# Patient Record
Sex: FEMALE | Race: WHITE
[De-identification: names, ages, dates, MRNs, and addresses within clinical notes are randomized per-mention and may not be internally consistent; named-entity substitution may affect disease eponyms.]

---

## 2018-04-03 ENCOUNTER — HOSPITAL ENCOUNTER (EMERGENCY)
Age: 43
Discharge: PSYCHIATRIC OR SUBSTANCE USE FACILITY | End: 2018-04-03
Payer: MEDICARE

## 2018-04-03 VITALS
RESPIRATION RATE: 18 BRPM | TEMPERATURE: 98 F | HEART RATE: 71 BPM | DIASTOLIC BLOOD PRESSURE: 85 MMHG | OXYGEN SATURATION: 97 % | SYSTOLIC BLOOD PRESSURE: 116 MMHG

## 2018-04-03 VITALS
OXYGEN SATURATION: 100 % | DIASTOLIC BLOOD PRESSURE: 68 MMHG | SYSTOLIC BLOOD PRESSURE: 152 MMHG | HEART RATE: 93 BPM | RESPIRATION RATE: 18 BRPM

## 2018-04-03 VITALS
SYSTOLIC BLOOD PRESSURE: 147 MMHG | TEMPERATURE: 97.2 F | RESPIRATION RATE: 18 BRPM | DIASTOLIC BLOOD PRESSURE: 91 MMHG | OXYGEN SATURATION: 100 % | HEART RATE: 75 BPM

## 2018-04-03 VITALS
HEART RATE: 72 BPM | RESPIRATION RATE: 16 BRPM | OXYGEN SATURATION: 98 % | SYSTOLIC BLOOD PRESSURE: 149 MMHG | DIASTOLIC BLOOD PRESSURE: 114 MMHG

## 2018-04-03 VITALS
OXYGEN SATURATION: 99 % | DIASTOLIC BLOOD PRESSURE: 114 MMHG | RESPIRATION RATE: 18 BRPM | HEART RATE: 80 BPM | SYSTOLIC BLOOD PRESSURE: 149 MMHG

## 2018-04-03 VITALS — BODY MASS INDEX: 27.2 KG/M2

## 2018-04-03 DIAGNOSIS — Z72.0: ICD-10-CM

## 2018-04-03 DIAGNOSIS — F31.9: Primary | ICD-10-CM

## 2018-04-03 DIAGNOSIS — F19.10: ICD-10-CM

## 2018-04-03 LAB
ALCOHOL, BLOOD (MEDICAL)-SERUM: 4 MG/DL
AMPHET UR-MCNC: POSITIVE NG/ML
ANION GAP: 6 (ref 5–15)
B-HCG SERPL QL: NEGATIVE NEGATIVE
BARBITURATE URINE VISTA: NEGATIVE
BENZODIAZEPINE URINE VISTA: NEGATIVE
BUN SERPL-MCNC: 15 MG/DL (ref 7–18)
BUN/CREAT RATIO: 19.3 RATIO (ref 10–20)
CALCIUM SERPL-MCNC: 9.3 MG/DL (ref 8.5–10.1)
CARBON DIOXIDE: 29 MMOL/L (ref 21–32)
CHLORIDE: 102 MMOL/L (ref 98–107)
COCAINE URINE VISTA: NEGATIVE
DEPRECATED RDW RBC: 40.8 FL (ref 35.1–43.9)
DIFFERENTIAL INDICATED: (no result)
DRUG CONFIRMATION TO FOLLOW?: (no result)
ECSTACY URINE VISTA: NEGATIVE
ERYTHROCYTE [DISTWIDTH] IN BLOOD: 13.2 % (ref 11.6–14.6)
EST GLOM FILT RATE - AFR AMER: 104 ML/MIN (ref 60–?)
ESTIMATED CREATININE CLEARANCE: 81.13 ML/MIN
GLUCOSE: 84 MG/DL (ref 74–106)
HCG SERPL QL: NEGATIVE NEGATIVE
HCT VFR BLD AUTO: 39.1 % (ref 37–47)
HEMOGLOBIN: 13.4 G/DL (ref 12–15)
HGB BLD-MCNC: 13.4 G/DL (ref 12–15)
IMMATURE GRANULOCYTES COUNT: 0.01 X10^3/UL (ref 0–0)
MCV RBC: 85.2 FL (ref 81–99)
MEAN CORP HGB CONC: 34.3 G/GL (ref 32–36)
MEAN PLATELET VOL.: 10.8 FL (ref 6.2–12)
METHADONE URINE VISTA: NEGATIVE
PCP UR QL: NEGATIVE
PH UR: 6 [PH]
PLATELET # BLD: 297 K/MM3 (ref 150–450)
PLATELET COUNT: 297 K/MM3 (ref 150–450)
POSITIVE COUNT: NO
POSITIVE DIFFERENTIAL: NO
POSITIVE MORPHOLOGY: NO
POTASSIUM: 3.7 MMOL/L (ref 3.5–5.1)
RBC # BLD AUTO: 4.59 M/MM3 (ref 4.2–5.4)
RBC DISTRIBUTION WIDTH CV: 13.2 % (ref 11.6–14.6)
RBC DISTRIBUTION WIDTH SD: 40.8 FL (ref 35.1–43.9)
THC URINE VISTA: POSITIVE
WBC # BLD AUTO: 10.2 K/MM3 (ref 4.4–11)
WHITE BLOOD COUNT: 10.2 K/MM3 (ref 4.4–11)

## 2018-04-03 PROCEDURE — 36415 COLL VENOUS BLD VENIPUNCTURE: CPT

## 2018-04-03 PROCEDURE — 80320 DRUG SCREEN QUANTALCOHOLS: CPT

## 2018-04-03 PROCEDURE — 96372 THER/PROPH/DIAG INJ SC/IM: CPT

## 2018-04-03 PROCEDURE — 99285 EMERGENCY DEPT VISIT HI MDM: CPT

## 2018-04-03 PROCEDURE — G0480 DRUG TEST DEF 1-7 CLASSES: HCPCS

## 2018-04-03 PROCEDURE — 85025 COMPLETE CBC W/AUTO DIFF WBC: CPT

## 2018-04-03 PROCEDURE — 84703 CHORIONIC GONADOTROPIN ASSAY: CPT

## 2018-04-03 PROCEDURE — 80048 BASIC METABOLIC PNL TOTAL CA: CPT

## 2018-04-03 PROCEDURE — 80307 DRUG TEST PRSMV CHEM ANLYZR: CPT

## 2018-04-12 ENCOUNTER — HOSPITAL ENCOUNTER (EMERGENCY)
Age: 43
LOS: 1 days | Discharge: PSYCHIATRIC OR SUBSTANCE USE FACILITY | End: 2018-04-13
Payer: MEDICARE

## 2018-04-12 VITALS
DIASTOLIC BLOOD PRESSURE: 75 MMHG | RESPIRATION RATE: 14 BRPM | HEART RATE: 82 BPM | SYSTOLIC BLOOD PRESSURE: 114 MMHG | OXYGEN SATURATION: 98 %

## 2018-04-12 VITALS
DIASTOLIC BLOOD PRESSURE: 70 MMHG | RESPIRATION RATE: 14 BRPM | HEART RATE: 75 BPM | OXYGEN SATURATION: 99 % | SYSTOLIC BLOOD PRESSURE: 132 MMHG

## 2018-04-12 VITALS
SYSTOLIC BLOOD PRESSURE: 118 MMHG | DIASTOLIC BLOOD PRESSURE: 70 MMHG | HEART RATE: 80 BPM | RESPIRATION RATE: 14 BRPM | OXYGEN SATURATION: 99 %

## 2018-04-12 VITALS
SYSTOLIC BLOOD PRESSURE: 128 MMHG | DIASTOLIC BLOOD PRESSURE: 87 MMHG | HEART RATE: 81 BPM | TEMPERATURE: 98.1 F | OXYGEN SATURATION: 97 % | RESPIRATION RATE: 17 BRPM

## 2018-04-12 VITALS — BODY MASS INDEX: 26.3 KG/M2

## 2018-04-12 DIAGNOSIS — Z79.899: ICD-10-CM

## 2018-04-12 DIAGNOSIS — F41.9: Primary | ICD-10-CM

## 2018-04-12 DIAGNOSIS — Z72.0: ICD-10-CM

## 2018-04-12 DIAGNOSIS — F22: ICD-10-CM

## 2018-04-12 LAB
ALANINE AMINOTRANSFER ALT/SGPT: 20 U/L (ref 13–56)
ALBUMIN SERPL-MCNC: 4 G/DL (ref 3.2–5)
ALCOHOL, BLOOD (MEDICAL)-SERUM: 3 MG/DL
ALKALINE PHOSPHATASE: 65 U/L (ref 45–117)
AMPHET UR-MCNC: POSITIVE NG/ML
ANION GAP: 8 (ref 5–15)
AST(SGOT): 18 U/L (ref 15–37)
B-HCG SERPL QL: NEGATIVE NEGATIVE
BARBITURATE URINE VISTA: NEGATIVE
BENZODIAZEPINE URINE VISTA: NEGATIVE
BUN SERPL-MCNC: 24 MG/DL (ref 7–18)
BUN/CREAT RATIO: 27.7 RATIO (ref 10–20)
CALCIUM SERPL-MCNC: 8.8 MG/DL (ref 8.5–10.1)
CARBON DIOXIDE: 26 MMOL/L (ref 21–32)
CHLORIDE: 105 MMOL/L (ref 98–107)
COCAINE URINE VISTA: NEGATIVE
DEPRECATED RDW RBC: 41.8 FL (ref 35.1–43.9)
DIFFERENTIAL COMMENT: (no result)
DIFFERENTIAL INDICATED: (no result)
DRUG CONFIRMATION TO FOLLOW?: (no result)
ECSTACY URINE VISTA: POSITIVE
ERYTHROCYTE [DISTWIDTH] IN BLOOD: 13.2 % (ref 11.6–14.6)
EST GLOM FILT RATE - AFR AMER: 92 ML/MIN (ref 60–?)
ESTIMATED CREATININE CLEARANCE: 73.59 ML/MIN
GLOBULIN: 3.2 G/DL (ref 2.2–4.2)
GLUCOSE: 82 MG/DL (ref 74–106)
HCG SERPL QL: NEGATIVE NEGATIVE
HCT VFR BLD AUTO: 38.5 % (ref 37–47)
HEMOGLOBIN: 13 G/DL (ref 12–15)
HGB BLD-MCNC: 13 G/DL (ref 12–15)
IMMATURE GRANULOCYTES COUNT: 0.01 X10^3/UL (ref 0–0)
MANUAL DIF COMMENT BLD-IMP: (no result)
MCV RBC: 85.9 FL (ref 81–99)
MEAN CORP HGB CONC: 33.8 G/GL (ref 32–36)
MEAN PLATELET VOL.: 10.9 FL (ref 6.2–12)
METHADONE URINE VISTA: NEGATIVE
PCP UR QL: NEGATIVE
PH UR: 5 [PH]
PLATELET # BLD: 347 K/MM3 (ref 150–450)
PLATELET COUNT: 347 K/MM3 (ref 150–450)
POSITIVE COUNT: NO
POSITIVE DIFFERENTIAL: NO
POSITIVE MORPHOLOGY: YES
POTASSIUM: 3.6 MMOL/L (ref 3.5–5.1)
RBC # BLD AUTO: 4.48 M/MM3 (ref 4.2–5.4)
RBC DISTRIBUTION WIDTH CV: 13.2 % (ref 11.6–14.6)
RBC DISTRIBUTION WIDTH SD: 41.8 FL (ref 35.1–43.9)
SCAN SMEAR PER REVIEW CRITERIA: (no result)
THC URINE VISTA: POSITIVE
WBC # BLD AUTO: 9.4 K/MM3 (ref 4.4–11)
WHITE BLOOD COUNT: 9.4 K/MM3 (ref 4.4–11)

## 2018-04-12 PROCEDURE — 80307 DRUG TEST PRSMV CHEM ANLYZR: CPT

## 2018-04-12 PROCEDURE — 84703 CHORIONIC GONADOTROPIN ASSAY: CPT

## 2018-04-12 PROCEDURE — 85025 COMPLETE CBC W/AUTO DIFF WBC: CPT

## 2018-04-12 PROCEDURE — 99285 EMERGENCY DEPT VISIT HI MDM: CPT

## 2018-04-12 PROCEDURE — 80320 DRUG SCREEN QUANTALCOHOLS: CPT

## 2018-04-12 PROCEDURE — 80053 COMPREHEN METABOLIC PANEL: CPT

## 2018-04-12 PROCEDURE — G0480 DRUG TEST DEF 1-7 CLASSES: HCPCS

## 2018-04-13 VITALS
OXYGEN SATURATION: 98 % | HEART RATE: 73 BPM | RESPIRATION RATE: 16 BRPM | SYSTOLIC BLOOD PRESSURE: 110 MMHG | DIASTOLIC BLOOD PRESSURE: 79 MMHG | TEMPERATURE: 98.06 F

## 2018-04-13 VITALS
OXYGEN SATURATION: 98 % | SYSTOLIC BLOOD PRESSURE: 116 MMHG | RESPIRATION RATE: 14 BRPM | HEART RATE: 78 BPM | DIASTOLIC BLOOD PRESSURE: 76 MMHG | TEMPERATURE: 97.6 F

## 2018-04-13 VITALS
DIASTOLIC BLOOD PRESSURE: 72 MMHG | SYSTOLIC BLOOD PRESSURE: 118 MMHG | RESPIRATION RATE: 15 BRPM | OXYGEN SATURATION: 97 % | HEART RATE: 69 BPM

## 2018-04-13 VITALS — RESPIRATION RATE: 16 BRPM

## 2018-04-13 VITALS — RESPIRATION RATE: 14 BRPM

## 2024-02-16 ENCOUNTER — HOSPITAL ENCOUNTER (EMERGENCY)
Age: 49
Discharge: ANOTHER HOSPITAL | End: 2024-02-17
Attending: EMERGENCY MEDICINE
Payer: COMMERCIAL

## 2024-02-16 DIAGNOSIS — F23 ACUTE PSYCHOSIS (HCC): Primary | ICD-10-CM

## 2024-02-16 PROBLEM — F43.10 PTSD (POST-TRAUMATIC STRESS DISORDER): Status: ACTIVE | Noted: 2024-02-16

## 2024-02-16 PROBLEM — F15.20 SEVERE STIMULANT USE DISORDER (HCC): Status: ACTIVE | Noted: 2024-02-16

## 2024-02-16 LAB
ACETAMINOPHEN LEVEL: <5 UG/ML (ref 15–30)
ALBUMIN SERPL-MCNC: 4.5 GM/DL (ref 3.4–5)
ALCOHOL SCREEN SERUM: <0.01 %WT/VOL
ALP BLD-CCNC: 78 IU/L (ref 40–128)
ALT SERPL-CCNC: 22 U/L (ref 10–40)
AMPHETAMINES: ABNORMAL
ANION GAP SERPL CALCULATED.3IONS-SCNC: 10 MMOL/L (ref 7–16)
AST SERPL-CCNC: 27 IU/L (ref 15–37)
BARBITURATE SCREEN URINE: NEGATIVE
BASOPHILS ABSOLUTE: 0.1 K/CU MM
BASOPHILS RELATIVE PERCENT: 0.7 % (ref 0–1)
BENZODIAZEPINE SCREEN, URINE: ABNORMAL
BILIRUB SERPL-MCNC: 0.9 MG/DL (ref 0–1)
BUN SERPL-MCNC: 17 MG/DL (ref 6–23)
CALCIUM SERPL-MCNC: 9.4 MG/DL (ref 8.3–10.6)
CANNABINOID SCREEN URINE: ABNORMAL
CHLORIDE BLD-SCNC: 98 MMOL/L (ref 99–110)
CO2: 29 MMOL/L (ref 21–32)
COCAINE METABOLITE: ABNORMAL
CREAT SERPL-MCNC: 0.5 MG/DL (ref 0.6–1.1)
DIFFERENTIAL TYPE: ABNORMAL
DOSE AMOUNT: ABNORMAL
DOSE AMOUNT: ABNORMAL
DOSE TIME: ABNORMAL
DOSE TIME: ABNORMAL
EOSINOPHILS ABSOLUTE: 0.2 K/CU MM
EOSINOPHILS RELATIVE PERCENT: 1.2 % (ref 0–3)
FENTANYL URINE: NEGATIVE
GFR SERPL CREATININE-BSD FRML MDRD: >60 ML/MIN/1.73M2
GLUCOSE SERPL-MCNC: 86 MG/DL (ref 70–99)
GONADOTROPIN, CHORIONIC (HCG) QUANT: <1 UIU/ML
HCT VFR BLD CALC: 42 % (ref 37–47)
HEMOGLOBIN: 13.7 GM/DL (ref 12.5–16)
IMMATURE NEUTROPHIL %: 0.4 % (ref 0–0.43)
LYMPHOCYTES ABSOLUTE: 5 K/CU MM
LYMPHOCYTES RELATIVE PERCENT: 41.6 % (ref 24–44)
MCH RBC QN AUTO: 29.3 PG (ref 27–31)
MCHC RBC AUTO-ENTMCNC: 32.6 % (ref 32–36)
MCV RBC AUTO: 89.9 FL (ref 78–100)
MONOCYTES ABSOLUTE: 0.8 K/CU MM
MONOCYTES RELATIVE PERCENT: 6.8 % (ref 0–4)
NUCLEATED RBC %: 0 %
OPIATES, URINE: NEGATIVE
OXYCODONE: NEGATIVE
PDW BLD-RTO: 11.6 % (ref 11.7–14.9)
PLATELET # BLD: 624 K/CU MM (ref 140–440)
PMV BLD AUTO: 9.9 FL (ref 7.5–11.1)
POTASSIUM SERPL-SCNC: 3.4 MMOL/L (ref 3.5–5.1)
RBC # BLD: 4.67 M/CU MM (ref 4.2–5.4)
SALICYLATE LEVEL: <0.3 MG/DL (ref 15–30)
SEGMENTED NEUTROPHILS ABSOLUTE COUNT: 6 K/CU MM
SEGMENTED NEUTROPHILS RELATIVE PERCENT: 49.3 % (ref 36–66)
SODIUM BLD-SCNC: 137 MMOL/L (ref 135–145)
TOTAL IMMATURE NEUTOROPHIL: 0.05 K/CU MM
TOTAL NUCLEATED RBC: 0 K/CU MM
TOTAL PROTEIN: 7.9 GM/DL (ref 6.4–8.2)
WBC # BLD: 12.1 K/CU MM (ref 4–10.5)

## 2024-02-16 PROCEDURE — G0480 DRUG TEST DEF 1-7 CLASSES: HCPCS

## 2024-02-16 PROCEDURE — 80053 COMPREHEN METABOLIC PANEL: CPT

## 2024-02-16 PROCEDURE — 99283 EMERGENCY DEPT VISIT LOW MDM: CPT | Performed by: NURSE PRACTITIONER

## 2024-02-16 PROCEDURE — 80307 DRUG TEST PRSMV CHEM ANLYZR: CPT

## 2024-02-16 PROCEDURE — 6360000002 HC RX W HCPCS: Performed by: EMERGENCY MEDICINE

## 2024-02-16 PROCEDURE — 96372 THER/PROPH/DIAG INJ SC/IM: CPT

## 2024-02-16 PROCEDURE — 99285 EMERGENCY DEPT VISIT HI MDM: CPT

## 2024-02-16 PROCEDURE — 84702 CHORIONIC GONADOTROPIN TEST: CPT

## 2024-02-16 PROCEDURE — 85025 COMPLETE CBC W/AUTO DIFF WBC: CPT

## 2024-02-16 RX ORDER — HALOPERIDOL 5 MG/ML
5 INJECTION INTRAMUSCULAR ONCE
Status: COMPLETED | OUTPATIENT
Start: 2024-02-16 | End: 2024-02-16

## 2024-02-16 RX ORDER — DIPHENHYDRAMINE HYDROCHLORIDE 50 MG/ML
50 INJECTION INTRAMUSCULAR; INTRAVENOUS ONCE
Status: COMPLETED | OUTPATIENT
Start: 2024-02-16 | End: 2024-02-16

## 2024-02-16 RX ORDER — DIAZEPAM 5 MG/ML
5 INJECTION, SOLUTION INTRAMUSCULAR; INTRAVENOUS EVERY 4 HOURS PRN
Status: DISCONTINUED | OUTPATIENT
Start: 2024-02-16 | End: 2024-02-17 | Stop reason: HOSPADM

## 2024-02-16 RX ORDER — DIPHENHYDRAMINE HYDROCHLORIDE 50 MG/ML
25 INJECTION INTRAMUSCULAR; INTRAVENOUS ONCE
Status: DISCONTINUED | OUTPATIENT
Start: 2024-02-16 | End: 2024-02-17 | Stop reason: HOSPADM

## 2024-02-16 RX ORDER — DIPHENHYDRAMINE HYDROCHLORIDE 50 MG/ML
INJECTION INTRAMUSCULAR; INTRAVENOUS
Status: DISCONTINUED
Start: 2024-02-16 | End: 2024-02-17 | Stop reason: HOSPADM

## 2024-02-16 RX ORDER — LORAZEPAM 2 MG/ML
INJECTION INTRAMUSCULAR
Status: DISCONTINUED
Start: 2024-02-16 | End: 2024-02-17 | Stop reason: HOSPADM

## 2024-02-16 RX ORDER — HALOPERIDOL 5 MG/ML
5 INJECTION INTRAMUSCULAR ONCE
Status: DISCONTINUED | OUTPATIENT
Start: 2024-02-16 | End: 2024-02-17 | Stop reason: HOSPADM

## 2024-02-16 RX ORDER — ZIPRASIDONE MESYLATE 20 MG/ML
20 INJECTION, POWDER, LYOPHILIZED, FOR SOLUTION INTRAMUSCULAR ONCE
Status: DISCONTINUED | OUTPATIENT
Start: 2024-02-16 | End: 2024-02-17 | Stop reason: HOSPADM

## 2024-02-16 RX ORDER — MIDAZOLAM HYDROCHLORIDE 2 MG/2ML
2 INJECTION, SOLUTION INTRAMUSCULAR; INTRAVENOUS ONCE
Status: COMPLETED | OUTPATIENT
Start: 2024-02-16 | End: 2024-02-16

## 2024-02-16 RX ORDER — HALOPERIDOL 5 MG/ML
INJECTION INTRAMUSCULAR
Status: DISCONTINUED
Start: 2024-02-16 | End: 2024-02-17 | Stop reason: HOSPADM

## 2024-02-16 RX ADMIN — MIDAZOLAM 2 MG: 1 INJECTION INTRAMUSCULAR; INTRAVENOUS at 04:09

## 2024-02-16 RX ADMIN — DIPHENHYDRAMINE HYDROCHLORIDE 50 MG: 50 INJECTION, SOLUTION INTRAMUSCULAR; INTRAVENOUS at 04:09

## 2024-02-16 RX ADMIN — HALOPERIDOL LACTATE 5 MG: 5 INJECTION, SOLUTION INTRAMUSCULAR at 04:09

## 2024-02-16 NOTE — ED PROVIDER NOTES
I assumed care of this patient while she states she is in the emergency department awaiting a social assessment.  Patient refused twice to talk to .  Patient had to be sedated for disruptive behavioral problems last night.  When patient woke up she is very belligerent and she appears very psychotic with the disjointed tangential thoughts.  She is uncooperative.  saw the patient and she advised that patient needs to be admitted for her acute psychosis.  On that basis patient will be held in the emergency department until psychiatric bed is found for her.    Patient is currently medically stable       Brian Mcdermott MD  02/16/24 [id number]       Brian Mcdermott MD  02/16/24 [id number]

## 2024-02-16 NOTE — ED NOTES
Pt became aggressive throwing the tray table around the room, code violet was called.  ordered medications, pt was willing to take medications. Pt refusing to talk to telepsych inpatient psych doctor consulted to come talk to the patient face to face at this time. Code violet ended.
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
## 2024-02-16 NOTE — ED PROVIDER NOTES
Transfer of Care Note:   Physician Signing out: Dr Mcdermott  Receiving Physician: Ray Foley MD  Sign out time: 330p      Brief history:  48-year-old female presenting for altered mental status.  Found to be drug-induced psychosis.  Medically cleared.  Evaluated by psych who recommended inpatient admission.  Esparto slipped.  Awaiting acceptance and placement.    Items pending that need to be checked:  Acceptance & placement      Additional Assessment and results:   I have personally performed a face to face diagnostic evaluation on this patient. The patient's initial evaluation and plan have been discussed with the prior physician who initially evaluated the patient. Nursing Notes, Past Medical Hx, Past Surgical Hx, Social Hx, Allergies, vital signs and Family Hx were all reviewed.    Vitals:    02/16/24 0435   BP:    Pulse:    Resp:    Temp: 97.6 °F (36.4 °C)   SpO2:            Labs Reviewed   CBC WITH AUTO DIFFERENTIAL - Abnormal; Notable for the following components:       Result Value    WBC 12.1 (*)     RDW 11.6 (*)     Platelets 624 (*)     Monocytes % 6.8 (*)     All other components within normal limits   COMPREHENSIVE METABOLIC PANEL - Abnormal; Notable for the following components:    Potassium 3.4 (*)     Chloride 98 (*)     Creatinine 0.5 (*)     All other components within normal limits   SALICYLATE LEVEL - Abnormal; Notable for the following components:    Salicylate Lvl <0.3 (*)     All other components within normal limits   ACETAMINOPHEN LEVEL - Abnormal; Notable for the following components:    Acetaminophen Level <5.0 (*)     All other components within normal limits   URINE DRUG SCREEN - Abnormal; Notable for the following components:    Cannabinoid Scrn, Ur UNCONFIRMED POSITIVE (*)     Amphetamines UNCONFIRMED POSITIVE (*)     Cocaine Metabolite UNCONFIRMED POSITIVE (*)     Benzodiazepine Screen, Urine UNCONFIRMED POSITIVE (*)     All other components within normal limits   HCG,

## 2024-02-16 NOTE — ED NOTES
Rn and this tech attempted to change patient out of coat and this pt became irritated. Pt threw coat at this tech and stated \" take my coat and I will fucking kill you bitch\".

## 2024-02-16 NOTE — VIRTUAL HEALTH
Reason for Cancel: TelePsych Reason for Cancel: Patient refused    Pt again refused to speak to telepsych. Staff attempted to explain the importance of meeting with telepsych in order to determine disposition and pt adamantly refused multiple times. Spoke to RNMamta.     --TANO Alba on 2/16/2024 at 1:25 PM    An electronic signature was used to authenticate this note.

## 2024-02-16 NOTE — VIRTUAL HEALTH
Reason for Cancel: TelePsych Reason for Cancel: Patient refused    Pt refused to allow writer to complete assessment stating “I'm not mentally unwell and I don't need psych services. This isn't a psych reason and I don't want to talk to you anymore”. Pt was very tangential and difficult to follow and was not agreeable to discuss anything assessment related. Writer made ED MD, Dr. Torres aware. Advised to reconsult telepsych if and when pt is more agreeable.      --TANO Alba on 2/16/2024 at 11:34 AM    An electronic signature was used to authenticate this note.

## 2024-02-16 NOTE — CONSULTS
Initial Psychiatric History and Physical    Ilene Tanner  [id number]  2/16/2024 02/16/24    ID: Patient is a 48 yrs y.o. female    CC:manic    HPI: Lenin is a 48 year old female who presents to [facility] ED Via ems with concerns of psychotic behavior. 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 Patient has received calming medications several times due to paranoia and verbal aggression. She threatened staff \"if you take my coat I'm going to kill you' Patient able to be redirected. She has allowed blood draws. Patient refusing to be assessed several times by Telapsych providers. Psychiatry consulted by Dr Mcdermott due to \"psychosis.\"    Per nursing, patient was [place] walking on a highway just in her white fluffy coat and no clothes in cold weather. She was brought to the ED for an evaluation. Through the night, patient has threatened to eat staff and kill them at different times.     Met with patient at bedside. Sitter present. She has refused [facility] psych several time as the \"kiosk\" scared her and she will talk to them. She notes today is February 17, 2024 with great conviction, and she is here in [facility] due to freezing weather and wanting to be checked for \"frost bite and glucose.\"  Patient with rapid pressured speech and flight of ideas. She is unable to care for herself due to her MH presentation at this time. She also endorses utilizing several stimulants. She is eating currently and ravenous, eating quickly. She comments she came to [place], from [facility],  over 9 days ago to visit someone who no longer lives here. She reports an admission from   [facility] and was recently dc. There are no records to confirm this admission or any others on care everywhere.  She was told she would be cabbed to rehab but was sent   to [facility] and walked around  logical or  goal-directed. No tangentiality or circumstantiality. Noted flight of ideas and loosening of associations.  Thought Content/Perceptions: No BALWINDER, no AVH, noted of grandeur, paranoia delusion  Insight: poor  Judgment impaired  Memory: Immediate, recent, and remote appear impaired, though not formally tested.  Attention: maintained throughout interview  Fund of knowledge: Average  Gait/Balance: DUC  Overall level of suicide risk: High risk despite CSSRS due to impulsivity and uncontrolled jennifer        2/16/2024     7:28 AM   C-SSRS Suicide Screening   1) Within the past month, have you wished you were dead or wished you could go to sleep and not wake up?  No   2) Have you actually had any thoughts of killing yourself?  No   6) Have you ever done anything, started to do anything, or prepared to do anything to end your life? No        Impression:   Psychotic  Stimulant use disorder severe  Bipolar disorder with psychosis  PTSD         Plan:  Recommend inpatient psychiatric hospitalization once medically appropriate. Patient unable to care for herself due to MH and psychosis at this time. Status pending  Continue with arpita, cecy and ep until patient is transferred  Psychiatry will follow until transfer  Ty for this consult  Discussed with Dr Mcdermott at 1430 . Discussed with patients nurse also   Labs and EKG reviewed  No EKG for review  2/16/24 UDS  at 1014-+ cannabinoid, amphetamines, cocaine and benzodiazepine (most likely provided in our ED), Bal <0.01, hcg negative, K+ 3.4, Na+ 137, BUN 17, cr 0.5, WBC 12.1,  hemoglobin 13.7, platelets 624    Electronically signed by REAGAN Ventura - CNP on 2/16/2024 at 1:44 PM

## 2024-02-16 NOTE — TRANSFER CENTER NOTE
Transfer Center Checklist for Behavioral Health Transfers      Currently in Restraints Now or During this Encounter: No  (Specify if Agitation or self harm is noted in ED?)            Medical Clearance Documented and Verified in the Chart: Yes    LABS  Labs Resulted (see below, if applicable): Yes  Are Any of the Labs Abnormal: No    CBC:   Lab Results   Component Value Date/Time    WBC 12.1 02/16/2024 04:12 AM    RBC 4.67 02/16/2024 04:12 AM    HGB 13.7 02/16/2024 04:12 AM    HCT 42.0 02/16/2024 04:12 AM    MCV 89.9 02/16/2024 04:12 AM    MCH 29.3 02/16/2024 04:12 AM    MCHC 32.6 02/16/2024 04:12 AM    RDW 11.6 02/16/2024 04:12 AM     02/16/2024 04:12 AM    MPV 9.9 02/16/2024 04:12 AM     CMP:   Lab Results   Component Value Date/Time     02/16/2024 04:12 AM    K 3.4 02/16/2024 04:12 AM    CL 98 02/16/2024 04:12 AM    CO2 29 02/16/2024 04:12 AM    BUN 17 02/16/2024 04:12 AM    CREATININE 0.5 02/16/2024 04:12 AM    LABGLOM >60 02/16/2024 04:12 AM    GLUCOSE 86 02/16/2024 04:12 AM    PROT 7.9 02/16/2024 04:12 AM    LABALBU 4.5 02/16/2024 04:12 AM    CALCIUM 9.4 02/16/2024 04:12 AM    BILITOT 0.9 02/16/2024 04:12 AM    ALKPHOS 78 02/16/2024 04:12 AM    AST 27 02/16/2024 04:12 AM    ALT 22 02/16/2024 04:12 AM     Drug Panel:   Lab Results   Component Value Date/Time    OPIAU NEGATIVE 02/16/2024 10:14 AM     UA:No results found for: \"COLORU\", \"APPEARANCE\", \"LABPH\", \"PROTEINU\", \"GLUCOSEU\", \"KETUA\", \"BILIRUBINUR\", \"BLOODU\", \"UROBILINOGEN\", \"NITRU\", \"LEUKOCYTESUR\", \"WBCUA\", \"RBCUA\", \"EPITHUA\", \"BACTERIA\"  PREGNANCY TEST: No results found for: \"PREGTESTUR\"    Patient's Current Location: [facility] EMERGENCY DEPARTMENT     Chief Complaint   Patient presents with    Altered Mental Status       Dialysis: No    Target Destination: Close to [place]    Insurance: Payor: [facility] /  /  /      Current Psychotic Symptoms  Harmful Actions Towards Others: was throwing a side table
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
## 2024-02-17 VITALS
HEART RATE: 74 BPM | DIASTOLIC BLOOD PRESSURE: 63 MMHG | SYSTOLIC BLOOD PRESSURE: 103 MMHG | OXYGEN SATURATION: 98 % | TEMPERATURE: 98.3 F | RESPIRATION RATE: 18 BRPM